# Patient Record
Sex: FEMALE | Race: WHITE | NOT HISPANIC OR LATINO | URBAN - METROPOLITAN AREA
[De-identification: names, ages, dates, MRNs, and addresses within clinical notes are randomized per-mention and may not be internally consistent; named-entity substitution may affect disease eponyms.]

---

## 2018-06-07 ENCOUNTER — OFFICE VISIT (OUTPATIENT)
Dept: URBAN - METROPOLITAN AREA CLINIC 81 | Facility: CLINIC | Age: 83
End: 2018-06-07
Payer: COMMERCIAL

## 2018-06-07 DIAGNOSIS — H18.51 ENDOTHELIAL CORNEAL DYSTROPHY: ICD-10-CM

## 2018-06-07 PROCEDURE — 99213 OFFICE O/P EST LOW 20 MIN: CPT | Performed by: OPHTHALMOLOGY

## 2018-06-07 ASSESSMENT — INTRAOCULAR PRESSURE
OD: 12
OS: 10

## 2018-06-07 NOTE — IMPRESSION/PLAN
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 Side: OU.  Plan: Continue to monitor

## 2018-06-07 NOTE — IMPRESSION/PLAN
Impression: Diagnosis: Presence of intraocular lens. Code: Z96.1. Side: OU.  S/P Yag OU Plan: Continue to monitor

## 2018-06-07 NOTE — IMPRESSION/PLAN
Impression: Diagnosis: Capsular glaucoma with pseudoexfoliation of lens, bilateral, moderate stage. Code: F63.8523. Side: OU. IOP OU controlled on current regimen. No changes needed. Plan: Continue to monitor. continue Azopt BID OU, Combigam BID OU and Travatan QHS OU.

## 2018-10-24 ENCOUNTER — OFFICE VISIT (OUTPATIENT)
Dept: URBAN - METROPOLITAN AREA CLINIC 81 | Facility: CLINIC | Age: 83
End: 2018-10-24
Payer: COMMERCIAL

## 2018-10-24 PROCEDURE — 99213 OFFICE O/P EST LOW 20 MIN: CPT | Performed by: OPTOMETRIST

## 2018-10-24 ASSESSMENT — INTRAOCULAR PRESSURE
OD: 12
OS: 12

## 2018-10-24 NOTE — IMPRESSION/PLAN
Impression: Diagnosis: Capsular glaucoma with pseudoexfoliation of lens, bilateral, moderate stage. Code: Z91.8325. Side: OU. IOP OU controlled on current regimen. No changes needed. Plan: Continue to monitor. continue Azopt BID OU, Combigam BID OU and Travatan QHS OU.

## 2019-02-28 ENCOUNTER — OFFICE VISIT (OUTPATIENT)
Dept: URBAN - METROPOLITAN AREA CLINIC 81 | Facility: CLINIC | Age: 84
End: 2019-02-28
Payer: COMMERCIAL

## 2019-02-28 PROCEDURE — 99213 OFFICE O/P EST LOW 20 MIN: CPT | Performed by: OPHTHALMOLOGY

## 2019-02-28 ASSESSMENT — INTRAOCULAR PRESSURE
OS: 12
OD: 13

## 2019-02-28 NOTE — IMPRESSION/PLAN
Impression: Diagnosis: Capsular glaucoma with pseudoexfoliation of lens, bilateral, moderate stage. Code: A47.8644. Side: OU. IOP OU controlled on current regimen. No changes needed. Plan: Continue to monitor. continue Azopt BID OU, Combigam BID OU and Travatan QHS OU. Needs updated tests.

## 2019-06-06 ENCOUNTER — OFFICE VISIT (OUTPATIENT)
Dept: URBAN - METROPOLITAN AREA CLINIC 81 | Facility: CLINIC | Age: 84
End: 2019-06-06
Payer: COMMERCIAL

## 2019-06-06 PROCEDURE — 92083 EXTENDED VISUAL FIELD XM: CPT | Performed by: OPHTHALMOLOGY

## 2019-06-06 PROCEDURE — 92014 COMPRE OPH EXAM EST PT 1/>: CPT | Performed by: OPHTHALMOLOGY

## 2019-06-06 PROCEDURE — 92133 CPTRZD OPH DX IMG PST SGM ON: CPT | Performed by: OPHTHALMOLOGY

## 2019-06-06 ASSESSMENT — INTRAOCULAR PRESSURE
OS: 10
OD: 12

## 2019-06-06 ASSESSMENT — KERATOMETRY
OD: 44.00
OS: 42.63

## 2019-06-06 NOTE — IMPRESSION/PLAN
Impression: Diagnosis: Capsular glaucoma with pseudoexfoliation of lens, bilateral, moderate stage. Code: E11.4537. Side: OU. IOP OU controlled on current regimen. No changes needed. OCT stable compared to last.  VF stable compared to last. Plan: Continue to monitor. continue Azopt BID OU, Combigam BID OU and Travatan QHS OU.

## 2019-09-12 ENCOUNTER — OFFICE VISIT (OUTPATIENT)
Dept: URBAN - METROPOLITAN AREA CLINIC 81 | Facility: CLINIC | Age: 84
End: 2019-09-12
Payer: COMMERCIAL

## 2019-09-12 DIAGNOSIS — Z96.1 PRESENCE OF INTRAOCULAR LENS: ICD-10-CM

## 2019-09-12 PROCEDURE — 99213 OFFICE O/P EST LOW 20 MIN: CPT | Performed by: OPHTHALMOLOGY

## 2019-09-12 ASSESSMENT — INTRAOCULAR PRESSURE
OD: 14
OS: 13

## 2019-09-12 NOTE — IMPRESSION/PLAN
Impression: Diagnosis: Capsular glaucoma with pseudoexfoliation of lens, bilateral, moderate stage. Code: Z21.3479. Side: OU. IOP OU controlled on current regimen. No changes needed. Plan: Continue to monitor. continue Azopt BID OU, Combigam BID OU and Travatan QHS OU.

## 2019-12-05 ENCOUNTER — OFFICE VISIT (OUTPATIENT)
Dept: URBAN - METROPOLITAN AREA CLINIC 81 | Facility: CLINIC | Age: 84
End: 2019-12-05
Payer: COMMERCIAL

## 2019-12-05 PROCEDURE — 99213 OFFICE O/P EST LOW 20 MIN: CPT | Performed by: OPHTHALMOLOGY

## 2019-12-05 ASSESSMENT — INTRAOCULAR PRESSURE
OS: 12
OD: 16

## 2019-12-05 NOTE — IMPRESSION/PLAN
Impression: Diagnosis: Capsular glaucoma with pseudoexfoliation of lens, bilateral, moderate stage. Code: G24.2198. Side: OU. IOP OU ok/borderline today, ok for now Plan: Continue to monitor. continue Azopt BID OU, Combigam BID OU and Travatan QHS OU.  Watch IOP

## 2020-05-08 ENCOUNTER — OFFICE VISIT (OUTPATIENT)
Dept: URBAN - METROPOLITAN AREA CLINIC 81 | Facility: CLINIC | Age: 85
End: 2020-05-08
Payer: COMMERCIAL

## 2020-05-08 PROCEDURE — 99213 OFFICE O/P EST LOW 20 MIN: CPT | Performed by: OPHTHALMOLOGY

## 2020-05-08 ASSESSMENT — INTRAOCULAR PRESSURE
OD: 13
OS: 13

## 2020-05-08 NOTE — IMPRESSION/PLAN
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 Side: OU. Plan: Continue to monitor.

## 2020-05-08 NOTE — IMPRESSION/PLAN
Impression: Diagnosis: Capsular glaucoma with pseudoexfoliation of lens, bilateral, moderate stage. Code: S83.1008. Side: OU. IOP OU good today. Plan: Continue to monitor. Continue Azopt BID OU, Combigan BID OU and Travatan QHS OU. Watch IOP. Needs updated tests.

## 2020-05-08 NOTE — IMPRESSION/PLAN
Impression: Diagnosis: Presence of intraocular lens. Code: Z96.1. Side: OU. S/P Yag OU. Plan: Continue to monitor.

## 2020-08-12 ENCOUNTER — OFFICE VISIT (OUTPATIENT)
Dept: URBAN - METROPOLITAN AREA CLINIC 81 | Facility: CLINIC | Age: 85
End: 2020-08-12
Payer: COMMERCIAL

## 2020-08-12 DIAGNOSIS — H43.813 VITREOUS DEGENERATION, BILATERAL: ICD-10-CM

## 2020-08-12 PROCEDURE — 92083 EXTENDED VISUAL FIELD XM: CPT | Performed by: OPTOMETRIST

## 2020-08-12 PROCEDURE — 99213 OFFICE O/P EST LOW 20 MIN: CPT | Performed by: OPTOMETRIST

## 2020-08-12 PROCEDURE — 92133 CPTRZD OPH DX IMG PST SGM ON: CPT | Performed by: OPTOMETRIST

## 2020-08-12 ASSESSMENT — INTRAOCULAR PRESSURE
OD: 13
OS: 12

## 2020-08-12 NOTE — IMPRESSION/PLAN
Impression: Capslr glaucoma w/pseudxf lens, bilateral, moderate stage: S48.0183.

 - WORSENING - Both OCT and VF show likely progression from previous tests performed in 2019 (see reports) *** Recommend FU w/ Dr Tana Way to evaluate for possible SLT to improve IOP and maybe improve PXE material w/in TM Plan: Continue DZ, travatan z and combigan as directed. 

RTC 3 months w/ Dr Tana Way for SLT consult and Tx as necessary

## 2020-11-18 ENCOUNTER — OFFICE VISIT (OUTPATIENT)
Dept: URBAN - METROPOLITAN AREA CLINIC 81 | Facility: CLINIC | Age: 85
End: 2020-11-18
Payer: COMMERCIAL

## 2020-11-18 DIAGNOSIS — H40.1432 CAPSLR GLAUCOMA W/PSEUDXF LENS, BILATERAL, MODERATE STAGE: Primary | ICD-10-CM

## 2020-11-18 PROCEDURE — 99212 OFFICE O/P EST SF 10 MIN: CPT | Performed by: OPTOMETRIST

## 2020-11-18 ASSESSMENT — INTRAOCULAR PRESSURE
OS: 12
OD: 12

## 2020-11-18 NOTE — IMPRESSION/PLAN
Impression: Capslr glaucoma w/pseudxf lens, bilateral, moderate stage: H40.1432.

 - IOP stable OU on current medication - Recommend SLT still due to pseudoexfoliation etiology - Dr Sae Nelson out, [place] to wait until return Plan: Cont Paco Z QHS, Combigan + Azopt BID Schedule preop SLT next avail

## 2021-01-25 ENCOUNTER — OFFICE VISIT (OUTPATIENT)
Dept: URBAN - METROPOLITAN AREA CLINIC 81 | Facility: CLINIC | Age: 86
End: 2021-01-25
Payer: COMMERCIAL

## 2021-01-25 PROCEDURE — 99212 OFFICE O/P EST SF 10 MIN: CPT | Performed by: OPTOMETRIST

## 2021-01-25 RX ORDER — BIMATOPROST 0.1 MG/ML
0.01 % SOLUTION/ DROPS OPHTHALMIC
Qty: 2.5 | Refills: 6 | Status: INACTIVE
Start: 2021-01-25 | End: 2021-01-28

## 2021-01-25 RX ORDER — BRINZOLAMIDE 10 MG/ML
1 % SUSPENSION/ DROPS OPHTHALMIC
Qty: 10 | Refills: 3 | Status: ACTIVE
Start: 2021-01-25

## 2021-01-25 RX ORDER — BRIMONIDINE TARTRATE, TIMOLOL MALEATE 2; 5 MG/ML; MG/ML
SOLUTION/ DROPS OPHTHALMIC
Qty: 15 | Refills: 2 | Status: ACTIVE
Start: 2021-01-25

## 2021-01-25 ASSESSMENT — INTRAOCULAR PRESSURE
OS: 13
OD: 13

## 2021-01-25 NOTE — IMPRESSION/PLAN
Impression: Diagnosis: Endothelial corneal dystrophy, bilateral. Code: H18.513. Side: OU. Plan: Stable. Continue to monitor.

## 2021-01-25 NOTE — IMPRESSION/PLAN
Impression: Capslr glaucoma w/pseudxf lens, bilateral, moderate stage: H40.1432.

 - IOP stable OU on current medication - Recommend SLT still due to pseudoexfoliation etiology - Dr Micah Barrera out, [facility] to wait until return Plan: IOP stable OU. Continue Combigan OU BID, Azopt OU BID, and start Lumigan OU QHS (sample dispensed). Patient unable to squeeze the Travatan Z bottle, D/C Travatan.

## 2021-05-11 ENCOUNTER — OFFICE VISIT (OUTPATIENT)
Dept: URBAN - METROPOLITAN AREA CLINIC 76 | Facility: CLINIC | Age: 86
End: 2021-05-11
Payer: COMMERCIAL

## 2021-05-11 PROCEDURE — 99214 OFFICE O/P EST MOD 30 MIN: CPT | Performed by: OPHTHALMOLOGY

## 2021-05-11 ASSESSMENT — INTRAOCULAR PRESSURE
OS: 15
OD: 14

## 2021-05-11 NOTE — IMPRESSION/PLAN
Impression: Diagnosis: Endothelial corneal dystrophy, bilateral. Code: H18.513. Side: OU. Plan: Continue to monitor.

## 2021-05-11 NOTE — IMPRESSION/PLAN
Impression: Diagnosis: Capsular glaucoma with pseudoexfoliation of lens, bilateral, moderate stage. Code: H21.8136. IOP OU good today. No Lumigan due to cost. Plan: Continue to monitor. Continue Azopt BID OU, Combigan BID OU and Travatan QHS OU. Needs updated tests.

## 2021-10-05 ENCOUNTER — OFFICE VISIT (OUTPATIENT)
Dept: URBAN - METROPOLITAN AREA CLINIC 76 | Facility: CLINIC | Age: 86
End: 2021-10-05
Payer: COMMERCIAL

## 2021-10-05 DIAGNOSIS — H18.513 ENDOTHELIAL CORNEAL DYSTROPHY, BILATERAL: ICD-10-CM

## 2021-10-05 PROCEDURE — 99214 OFFICE O/P EST MOD 30 MIN: CPT | Performed by: OPHTHALMOLOGY

## 2021-10-05 PROCEDURE — 92133 CPTRZD OPH DX IMG PST SGM ON: CPT | Performed by: OPHTHALMOLOGY

## 2021-10-05 ASSESSMENT — INTRAOCULAR PRESSURE
OD: 12
OS: 12

## 2021-10-05 NOTE — IMPRESSION/PLAN
Impression: Diagnosis: Presence of intraocular lens. Code: Z96.1. Bilateral. S/P Yag OU. Plan: Continue to monitor.

## 2021-10-05 NOTE — IMPRESSION/PLAN
Impression: Diagnosis: Endothelial corneal dystrophy, bilateral. Code: H18.513. Plan: Continue to monitor.

## 2021-10-05 NOTE — IMPRESSION/PLAN
Impression: Capsular glaucoma with pseudoexfoliation of lens, bilateral, moderate stage. E53.3015. No Lumigan due to cost.  IOP OU good today. OCT stable compared to last.  VF not in service. Plan: Discussed condition. Continue Azopt BID OU, Combigan BID OU and Travatan QHS OU. Needs updated VF.

## 2022-01-07 ENCOUNTER — TESTING ONLY (OUTPATIENT)
Dept: URBAN - METROPOLITAN AREA CLINIC 81 | Facility: CLINIC | Age: 87
End: 2022-01-07
Payer: COMMERCIAL

## 2022-01-07 PROCEDURE — 92083 EXTENDED VISUAL FIELD XM: CPT | Performed by: OPHTHALMOLOGY

## 2022-01-07 PROCEDURE — 92133 CPTRZD OPH DX IMG PST SGM ON: CPT | Performed by: OPHTHALMOLOGY

## 2022-01-13 ENCOUNTER — OFFICE VISIT (OUTPATIENT)
Dept: URBAN - METROPOLITAN AREA CLINIC 76 | Facility: CLINIC | Age: 87
End: 2022-01-13
Payer: COMMERCIAL

## 2022-01-13 PROCEDURE — 92083 EXTENDED VISUAL FIELD XM: CPT | Performed by: OPHTHALMOLOGY

## 2022-01-13 PROCEDURE — 99214 OFFICE O/P EST MOD 30 MIN: CPT | Performed by: OPHTHALMOLOGY

## 2022-01-13 PROCEDURE — 92133 CPTRZD OPH DX IMG PST SGM ON: CPT | Performed by: OPHTHALMOLOGY

## 2022-01-13 ASSESSMENT — INTRAOCULAR PRESSURE
OS: 14
OD: 15

## 2022-01-13 NOTE — IMPRESSION/PLAN
Impression: Capsular glaucoma with pseudoexfoliation of lens, bilateral, moderate stage. C54.3976. No Lumigan due to cost.  IOP OU fluctuating, ok for now. VF stable compared to last. Plan: Discussed condition. Continue Azopt BID OU, Combigan BID OU and Travatan QHS OU.

## 2022-04-19 ENCOUNTER — OFFICE VISIT (OUTPATIENT)
Dept: URBAN - METROPOLITAN AREA CLINIC 76 | Facility: CLINIC | Age: 87
End: 2022-04-19
Payer: COMMERCIAL

## 2022-04-19 DIAGNOSIS — H40.1432 CAPSULAR GLAUCOMA WITH PSEUDOEXFOLIATION OF LENS, BILATERAL, MODERATE STAGE: Primary | ICD-10-CM

## 2022-04-19 DIAGNOSIS — Z96.1 PRESENCE OF INTRAOCULAR LENS: ICD-10-CM

## 2022-04-19 PROCEDURE — 99214 OFFICE O/P EST MOD 30 MIN: CPT | Performed by: OPHTHALMOLOGY

## 2022-04-19 RX ORDER — BRINZOLAMIDE 10 MG/ML
1 % SUSPENSION/ DROPS OPHTHALMIC
Qty: 10 | Refills: 3 | Status: ACTIVE
Start: 2022-04-19

## 2022-04-19 RX ORDER — TRAVOPROST 0.04 MG/ML
0.004 % SOLUTION/ DROPS OPHTHALMIC
Qty: 7.5 | Refills: 3 | Status: ACTIVE
Start: 2022-04-19

## 2022-04-19 RX ORDER — BRIMONIDINE TARTRATE, TIMOLOL MALEATE 2; 5 MG/ML; MG/ML
SOLUTION/ DROPS OPHTHALMIC
Qty: 15 | Refills: 3 | Status: ACTIVE
Start: 2022-04-19

## 2022-04-19 ASSESSMENT — INTRAOCULAR PRESSURE
OS: 16
OD: 16

## 2022-04-19 NOTE — IMPRESSION/PLAN
Impression: Capsular glaucoma with pseudoexfoliation of lens, bilateral, moderate stage. N20.4994. No Lumigan due to cost.  IOP fluctuating OU but ok for now. Pt reports having hard time remember 2nd dose of BID drops. Plan: Discussed condition. Continue Azopt BID OU, Combigan BID OU and Travatan QHS OU. Emphasized compliance. Repeat VF/OCT 1/2023. Pt prefers to be seen in [place].

## 2022-08-04 ENCOUNTER — OFFICE VISIT (OUTPATIENT)
Dept: URBAN - METROPOLITAN AREA CLINIC 81 | Facility: CLINIC | Age: 87
End: 2022-08-04
Payer: COMMERCIAL

## 2022-08-04 DIAGNOSIS — H52.223 REGULAR ASTIGMATISM, BILATERAL: Primary | ICD-10-CM

## 2022-08-04 PROCEDURE — 92014 COMPRE OPH EXAM EST PT 1/>: CPT | Performed by: OPTOMETRIST

## 2022-08-04 ASSESSMENT — INTRAOCULAR PRESSURE
OS: 15
OD: 15

## 2022-08-04 ASSESSMENT — VISUAL ACUITY
OD: 20/25
OS: 20/30

## 2022-08-04 ASSESSMENT — KERATOMETRY
OD: 44.50
OS: 44.75

## 2022-10-19 ENCOUNTER — OFFICE VISIT (OUTPATIENT)
Dept: URBAN - METROPOLITAN AREA CLINIC 81 | Facility: CLINIC | Age: 87
End: 2022-10-19
Payer: COMMERCIAL

## 2022-10-19 DIAGNOSIS — H04.123 DRY EYE SYNDROME OF BILATERAL LACRIMAL GLANDS: ICD-10-CM

## 2022-10-19 DIAGNOSIS — H18.513 ENDOTHELIAL CORNEAL DYSTROPHY, BILATERAL: ICD-10-CM

## 2022-10-19 DIAGNOSIS — Z96.1 PRESENCE OF INTRAOCULAR LENS: ICD-10-CM

## 2022-10-19 DIAGNOSIS — H40.1432 CAPSULAR GLAUCOMA WITH PSEUDOEXFOLIATION OF LENS, BILATERAL, MODERATE STAGE: Primary | ICD-10-CM

## 2022-10-19 PROCEDURE — 92133 CPTRZD OPH DX IMG PST SGM ON: CPT | Performed by: OPTOMETRIST

## 2022-10-19 PROCEDURE — 99214 OFFICE O/P EST MOD 30 MIN: CPT | Performed by: OPTOMETRIST

## 2022-10-19 ASSESSMENT — INTRAOCULAR PRESSURE
OS: 16
OD: 16

## 2022-10-19 NOTE — IMPRESSION/PLAN
Impression: Dry eye syndrome of bilateral lacrimal glands: H04.123. Plan: Discussed diagnosis in detail with patient. Dry eye accounts for the patient's symptoms. Dry eye is a chronic condition and does not have a cure and will need artificial tears for maintenance. Systane Complete or Refresh Relieva OU BID-QID longterm. Monitor for changes.

## 2022-10-19 NOTE — IMPRESSION/PLAN
Impression: Diagnosis: Presence of intraocular lens. Code: Z96.1. Bilateral. S/P Yag OU. Plan: Stable, monitor.

## 2022-10-19 NOTE — IMPRESSION/PLAN
Impression: Capsular glaucoma with pseudoexfoliation of lens, bilateral, moderate stage. A78.9014. 
-s/p SLT OU 01/26/2016
-No Lumigan due to cost.  
-good compliance with drops reported
-10/19/2022 order and performed OCT ON, reviewed today OD thinning sup and inf and OS thinning sup, stable OU from previous -01/07/2022 VF 24-2, nasal step OU
-pachymetry  and  Plan: Discussed condition. Continue Azopt BID OU, Combigan BID OU and Travatan QHS OU. Emphasized compliance with drops.  Monitor

## 2022-10-19 NOTE — IMPRESSION/PLAN
Impression: Diagnosis: Endothelial corneal dystrophy, bilateral. Code: H18.513. Side: OU. Plan: Stable, continue to monitor.

## 2023-02-23 ENCOUNTER — OFFICE VISIT (OUTPATIENT)
Dept: URBAN - METROPOLITAN AREA CLINIC 81 | Facility: CLINIC | Age: 88
End: 2023-02-23
Payer: COMMERCIAL

## 2023-02-23 DIAGNOSIS — H40.1432 CAPSULAR GLAUCOMA WITH PSEUDOEXFOLIATION OF LENS, BILATERAL, MODERATE STAGE: Primary | ICD-10-CM

## 2023-02-23 DIAGNOSIS — H18.513 ENDOTHELIAL CORNEAL DYSTROPHY, BILATERAL: ICD-10-CM

## 2023-02-23 PROCEDURE — 99213 OFFICE O/P EST LOW 20 MIN: CPT | Performed by: OPTOMETRIST

## 2023-02-23 ASSESSMENT — INTRAOCULAR PRESSURE
OD: 10
OS: 10

## 2023-02-23 NOTE — IMPRESSION/PLAN
Impression: Diagnosis: Endothelial corneal dystrophy, bilateral. Code: H18.513. Side: OU. Plan: Stable OU, continue to monitor.

## 2023-02-23 NOTE — IMPRESSION/PLAN
Impression: Capsular glaucoma with pseudoexfoliation of lens, bilateral, moderate stage. Q96.3541. -IOP stable OU and well controlled OU
-s/p SLT OU 01/26/2016
-No Lumigan due to cost.  
-good compliance with drops reported
-10/19/2022 OCT ON, reviewed today OD thinning sup and inf and OS thinning sup, stable OU from previous -01/07/2022 VF 24-2, nasal step OU
-pachymetry  and  Plan: Discussed condition with patient. Continue Azopt BID OU, Combigan BID OU and Travatan QHS OU. Emphasized compliance with drops. Monitor.

## 2023-06-20 ENCOUNTER — OFFICE VISIT (OUTPATIENT)
Dept: URBAN - METROPOLITAN AREA CLINIC 81 | Facility: CLINIC | Age: 88
End: 2023-06-20
Payer: COMMERCIAL

## 2023-06-20 DIAGNOSIS — H40.1432 CAPSULAR GLAUCOMA WITH PSEUDOEXFOLIATION OF LENS, BILATERAL, MODERATE STAGE: Primary | ICD-10-CM

## 2023-06-20 DIAGNOSIS — Z96.1 PRESENCE OF INTRAOCULAR LENS: ICD-10-CM

## 2023-06-20 PROCEDURE — 99213 OFFICE O/P EST LOW 20 MIN: CPT | Performed by: OPTOMETRIST

## 2023-06-20 PROCEDURE — 92083 EXTENDED VISUAL FIELD XM: CPT | Performed by: OPTOMETRIST

## 2023-06-20 ASSESSMENT — INTRAOCULAR PRESSURE
OD: 12
OS: 14

## 2023-06-20 NOTE — IMPRESSION/PLAN
Impression: Capsular glaucoma with pseudoexfoliation of lens, bilateral, moderate stage. H38.8766. -IOP stable OU and well controlled OU
-s/p SLT OU 01/26/2016
-No Lumigan due to cost.  
-good compliance with drops reported
-10/19/2022 OCT ON, OD thinning sup and inf and OS thinning sup, stable OU from previous
-06/20/23 ordered and reviewed  VF 24-2, stable from previous 
-pachymetry  and  Plan: Discussed condition with patient. Continue Azopt BID OU, Combigan BID OU and Travatan QHS OU. Emphasized compliance with drops. Monitor.

## 2023-10-17 ENCOUNTER — OFFICE VISIT (OUTPATIENT)
Dept: URBAN - METROPOLITAN AREA CLINIC 81 | Facility: CLINIC | Age: 88
End: 2023-10-17
Payer: COMMERCIAL

## 2023-10-17 DIAGNOSIS — H40.1432 CAPSULAR GLAUCOMA WITH PSEUDOEXFOLIATION OF LENS, BILATERAL, MODERATE STAGE: Primary | ICD-10-CM

## 2023-10-17 DIAGNOSIS — Z96.1 PRESENCE OF INTRAOCULAR LENS: ICD-10-CM

## 2023-10-17 DIAGNOSIS — H04.123 DRY EYE SYNDROME OF BILATERAL LACRIMAL GLANDS: ICD-10-CM

## 2023-10-17 DIAGNOSIS — H18.513 ENDOTHELIAL CORNEAL DYSTROPHY, BILATERAL: ICD-10-CM

## 2023-10-17 PROCEDURE — 99213 OFFICE O/P EST LOW 20 MIN: CPT | Performed by: OPTOMETRIST

## 2023-10-17 PROCEDURE — 92133 CPTRZD OPH DX IMG PST SGM ON: CPT | Performed by: OPTOMETRIST

## 2023-10-17 ASSESSMENT — INTRAOCULAR PRESSURE
OD: 10
OS: 10

## 2024-02-13 ENCOUNTER — OFFICE VISIT (OUTPATIENT)
Dept: URBAN - METROPOLITAN AREA CLINIC 81 | Facility: CLINIC | Age: 89
End: 2024-02-13
Payer: COMMERCIAL

## 2024-02-13 DIAGNOSIS — H40.1432 CAPSULAR GLAUCOMA WITH PSEUDOEXFOLIATION OF LENS, BILATERAL, MODERATE STAGE: Primary | ICD-10-CM

## 2024-02-13 PROCEDURE — 99213 OFFICE O/P EST LOW 20 MIN: CPT | Performed by: OPTOMETRIST

## 2024-02-13 ASSESSMENT — INTRAOCULAR PRESSURE
OS: 10
OD: 12

## 2024-06-14 ENCOUNTER — OFFICE VISIT (OUTPATIENT)
Dept: URBAN - METROPOLITAN AREA CLINIC 81 | Facility: CLINIC | Age: 89
End: 2024-06-14
Payer: COMMERCIAL

## 2024-06-14 DIAGNOSIS — H40.1432 CAPSULAR GLAUCOMA WITH PSEUDOEXFOLIATION OF LENS, BILATERAL, MODERATE STAGE: Primary | ICD-10-CM

## 2024-06-14 PROCEDURE — 99213 OFFICE O/P EST LOW 20 MIN: CPT | Performed by: OPTOMETRIST

## 2024-06-14 PROCEDURE — 92083 EXTENDED VISUAL FIELD XM: CPT | Performed by: OPTOMETRIST

## 2024-06-14 ASSESSMENT — INTRAOCULAR PRESSURE
OD: 16
OS: 12

## 2024-10-14 ENCOUNTER — OFFICE VISIT (OUTPATIENT)
Dept: URBAN - METROPOLITAN AREA CLINIC 81 | Facility: CLINIC | Age: 89
End: 2024-10-14
Payer: COMMERCIAL

## 2024-10-14 DIAGNOSIS — H52.4 PRESBYOPIA: ICD-10-CM

## 2024-10-14 DIAGNOSIS — H18.513 ENDOTHELIAL CORNEAL DYSTROPHY, BILATERAL: ICD-10-CM

## 2024-10-14 DIAGNOSIS — H40.1432 CAPSULAR GLAUCOMA WITH PSEUDOEXFOLIATION OF LENS, BILATERAL, MODERATE STAGE: Primary | ICD-10-CM

## 2024-10-14 PROCEDURE — 92133 CPTRZD OPH DX IMG PST SGM ON: CPT | Performed by: OPTOMETRIST

## 2024-10-14 PROCEDURE — 99213 OFFICE O/P EST LOW 20 MIN: CPT | Performed by: OPTOMETRIST

## 2024-10-14 ASSESSMENT — INTRAOCULAR PRESSURE
OD: 11
OS: 8